# Patient Record
Sex: FEMALE | Race: WHITE
[De-identification: names, ages, dates, MRNs, and addresses within clinical notes are randomized per-mention and may not be internally consistent; named-entity substitution may affect disease eponyms.]

---

## 2020-01-20 ENCOUNTER — HOSPITAL ENCOUNTER (OUTPATIENT)
Dept: HOSPITAL 43 - DL.ENDO | Age: 65
Discharge: HOME | End: 2020-01-20
Attending: INTERNAL MEDICINE
Payer: COMMERCIAL

## 2020-01-20 DIAGNOSIS — Z88.2: ICD-10-CM

## 2020-01-20 DIAGNOSIS — G43.909: ICD-10-CM

## 2020-01-20 DIAGNOSIS — B96.81: ICD-10-CM

## 2020-01-20 DIAGNOSIS — E66.09: ICD-10-CM

## 2020-01-20 DIAGNOSIS — K21.9: Primary | ICD-10-CM

## 2020-01-20 DIAGNOSIS — Z88.8: ICD-10-CM

## 2020-01-20 DIAGNOSIS — Z86.010: ICD-10-CM

## 2020-01-20 DIAGNOSIS — Z87.19: ICD-10-CM

## 2020-01-20 DIAGNOSIS — I10: ICD-10-CM

## 2020-01-20 PROCEDURE — 43239 EGD BIOPSY SINGLE/MULTIPLE: CPT

## 2020-01-20 NOTE — OR
DATE:  01/20/2020

 

PROCEDURES:  Esophagogastroduodenoscopy and multiple pinch biopsies.

 

INSTRUMENT USED:  GIF- Olympus video panendoscope.

 

PREMEDICATIONS:  No oral or topical anesthesia used.  Fentanyl 100 mcg

intravenous, Versed 2 mg intravenous, nasal O2 cannula.

 

The procedure was done under pulse oximetry, BP recording, and cardiac monitor.

 

INDICATION:  The patient with persistent longstanding heartburn and also some

associated dysphagia unexplained and not responsive to medical measures.

 

Esophagogastroduodenoscopy is performed for detection of any active erosive

lesions, Patricio esophagus and/or malignancy also under consideration, H pylori

status to be determined, esophageal dilatations if indicated, endoscopic

hemostasis therapy if needed.

 

DESCRIPTION OF PROCEDURE:  The scope was passed with ease.  Adequate

visualization of the esophagus was made from proximal to distal areas.  No upper

esophageal lesions identified.  No distal esophageal stricture.  No uphill or

downhill esophageal varices.  No Estela-Shaver tear.  Grade A erosive changes

were noted by Mora criteria.  No esophageal polyp or tumor mass

identified.  Z-line was seen at around 40 cm distal to the oral verge,

configuration consistent with grade 1 by ZAP classification.  No proximal

gastric varices noted.  Gastric fundus examination by retroflexion showed no

polypoid lesions.  No gastric ulcer, malignant mass, or vascular ectasia

identified.  Duodenal bulb showed no ulcer.  Visualized 2nd part of the duodenum

was unremarkable.  Multiple pinch biopsies were taken from the gastric antrum

and proximal body, and sent for PyloriTek test for H pylori, and if negative in

an hour, the tissue is to be sent for histopathology.  No bleeding was noted

from any of the visualized areas at the completion of examination.  Photographs

were taken of the duodenal bulb, gastric antrum, fundus, and distal esophagus.

 

IMPRESSION:  Grade A gastroesophageal reflux disease.

 

The patient tolerated the procedure well.

 

DD:  01/20/2020 07:29:42

DT:  01/20/2020 09:48:04

[facility]

Job #:  [id number]